# Patient Record
Sex: MALE | NOT HISPANIC OR LATINO | Employment: FULL TIME | URBAN - METROPOLITAN AREA
[De-identification: names, ages, dates, MRNs, and addresses within clinical notes are randomized per-mention and may not be internally consistent; named-entity substitution may affect disease eponyms.]

---

## 2019-05-14 ENCOUNTER — HOSPITAL ENCOUNTER (OUTPATIENT)
Dept: OTHER | Facility: HOSPITAL | Age: 29
Discharge: HOME | End: 2019-05-14
Attending: NURSE PRACTITIONER

## 2019-05-14 ENCOUNTER — OFFICE VISIT CONVERTED (OUTPATIENT)
Dept: FAMILY MEDICINE CLINIC | Age: 29
End: 2019-05-14
Attending: NURSE PRACTITIONER

## 2019-05-17 ENCOUNTER — HOSPITAL ENCOUNTER (OUTPATIENT)
Dept: OTHER | Facility: HOSPITAL | Age: 29
Discharge: HOME | End: 2019-05-17
Attending: NURSE PRACTITIONER

## 2019-05-17 ENCOUNTER — OFFICE VISIT CONVERTED (OUTPATIENT)
Dept: FAMILY MEDICINE CLINIC | Age: 29
End: 2019-05-17
Attending: NURSE PRACTITIONER

## 2019-05-20 LAB
AMPICILLIN SUSC ISLT: <=0.25
BACTERIA SPEC AEROBE CULT: ABNORMAL
CEFOTAXIME SUSC ISLT: <=0.12
CEFTRIAXONE SUSC ISLT: <=0.12
CIPROFLOXACIN SUSC ISLT: <=0.5
CLINDAMYCIN SUSC ISLT: 0.25
CLINDAMYCIN SUSC ISLT: <=0.25
DAPTOMYCIN SUSC ISLT: 1
DOXYCYCLINE SUSC ISLT: <=0.5
ERYTHROMYCIN SUSC ISLT: >=8
ERYTHROMYCIN SUSC ISLT: >=8
GENTAMICIN SUSC ISLT: <=0.5
LEVOFLOXACIN SUSC ISLT: 0.25
LEVOFLOXACIN SUSC ISLT: 0.5
OXACILLIN SUSC ISLT: >=4
PENICILLIN G SUSC ISLT: <=0.06
RIFAMPIN SUSC ISLT: <=0.5
TETRACYCLINE SUSC ISLT: <=0.25
TETRACYCLINE SUSC ISLT: <=1
TMP SMX SUSC ISLT: <=10
VANCOMYCIN SUSC ISLT: 1
VANCOMYCIN SUSC ISLT: <=0.12

## 2020-03-23 ENCOUNTER — OFFICE VISIT CONVERTED (OUTPATIENT)
Dept: FAMILY MEDICINE CLINIC | Age: 30
End: 2020-03-23
Attending: FAMILY MEDICINE

## 2021-02-10 ENCOUNTER — OFFICE VISIT CONVERTED (OUTPATIENT)
Dept: FAMILY MEDICINE CLINIC | Age: 31
End: 2021-02-10
Attending: NURSE PRACTITIONER

## 2021-02-12 ENCOUNTER — HOSPITAL ENCOUNTER (OUTPATIENT)
Dept: OTHER | Facility: HOSPITAL | Age: 31
Discharge: HOME | End: 2021-02-12
Attending: NURSE PRACTITIONER

## 2021-05-18 NOTE — PROGRESS NOTES
Usman Aguilar 1990     Office/Outpatient Visit    Visit Date: Tue, May 14, 2019 02:28 pm    Provider: Hanna Salgado N.P. (Assistant: Sarah Spurling, MA)    Location: [facility]        Electronically signed by Hanna Salgado N.P. on  05/15/2019 05:23:05 PM                             SUBJECTIVE:        CC:     Mr. Aguilar is a 28 year old male.  This is his first visit to the clinic.  Right toenail is infected.          HPI:         PHQ-9 Depression Screening: Completed form scanned and in chart; Total Score 0 Alcohol Consumption Screening: Completed form scanned and in chart; Total Score 5         Complaint of toe pain..  The symptom began 2 weeks ago.  The severity is of severe intensity.  He estimates that the frequency of this symptom is had to have a toenail cut out before - been some time ago.  Associated symptoms include he states taht he had a dog land on that toe a few days ago and caused it to split open as well  (last tetanus 2014).  Prior work-up has included has had toenails cut out in the past.  right great toe started as an ingrown toenail - tried to cut it out and now star     ROS:     CONSTITUTIONAL:  Negative for chills, fatigue and fever.      CARDIOVASCULAR:  Negative for chest pain and pedal edema.      RESPIRATORY:  Negative for recent cough and dyspnea.      INTEGUMENTARY:  Positive for right great toenail with laceration to the side of the toe - as well as ingrown toenail.   Negative for pruritis or rash.      NEUROLOGICAL:  Negative for paresthesias.          PMH/FMH/SH:     Past Medical History:             PAST MEDICAL HISTORY     UNREMARKABLE         Surgical History:     NONE         Family History:         Positive for Myocardial Infarction ( father -- MI (survived) 60 YOA ).      Positive for Breast Cancer ( mat. aunt ).      Positive for Type 2 Diabetes ( mat. GM; mat. uncle ).          Social History:     Occupation: [employer]     Marital Status:       Children: 2 children         Tobacco/Alcohol/Supplements:     Last Reviewed on 5/14/2019 02:35 PM by Spurling, Sarah C    Tobacco: He currently uses smokeless tobacco, 1 can a day cans/pouches daily..          Alcohol: Frequency:    Every now and then.;     Caffeine:  He admits to consuming caffeine via coffee, tea, and soda.          Substance Abuse History:     None         Mental Health History:     NEGATIVE         Communicable Diseases (eg STDs):     Reportable health conditions; NEGATIVE             Current Problems:     Toe pain     Seasonal allergies     Screening for depression         Immunizations:     None        Allergies:     Last Reviewed on 5/14/2019 02:35 PM by Spurling, Sarah C      No Known Drug Allergies.         Current Medications:     Last Reviewed on 5/14/2019 02:36 PM by Spurling, Sarah C    Zyrtec 10mg Capsules Take 1 capsule(s) by mouth daily         OBJECTIVE:        Vitals:         Current: 5/14/2019 2:37:14 PM    Ht:  5 ft, 10 in;  Wt: 161.6 lbs;  BMI: 23.2    T: 98.6 F;  BP: 140/71 mm Hg (right arm, sitting);  P: 87 bpm (right arm (BP Cuff), sitting)        Exams:     PHYSICAL EXAM:     GENERAL: Vitals recorded well developed, well nourished;  no apparent distress;     RESPIRATORY: normal appearance and symmetric expansion of chest wall; normal respiratory rate and pattern with no distress; normal breath sounds with no rales, rhonchi, wheezes or rubs;     CARDIOVASCULAR: normal rate; rhythm is regular;  no edema;     SKIN: laceration to right great toe - no acvit bleeding  - some discoloration of the lateral edge of the nail bed - evidence of purulent drainage, but after cleaning , unable to express any purulent drainage;     MUSCULOSKELETAL: normal gait; normal range of motion of all major muscle groups; no limb or joint pain with range of motion;     NEUROLOGIC: mental status: alert and oriented x 3;     PSYCHIATRIC: appropriate affect and demeanor; normal speech pattern; normal  thought and perception;         ASSESSMENT           V79.0   Z13.89  Screening for depression              DDx:     729.5   M79.674  Toe pain              DDx:         ORDERS:         Meds Prescribed:       Doxycycline Monohydrate 100mg Capsules Take 1 capsule(s) by mouth bid x10 days  #20 (Twenty) capsule(s) Refills: 0         Procedures Ordered:       REFER  Referral to Specialist or Other Facility  (Send-Out)           Other Orders:         Depression screen negative  (In-House)           Negative EtOH screen  (In-House)                   PLAN:          Toe pain         REFERRALS:  Referral initiated to a podiatrist ( for evaluation of ingrown toenail  - right great ).            Prescriptions:       Doxycycline Monohydrate 100mg Capsules Take 1 capsule(s) by mouth bid x10 days  #20 (Twenty) capsule(s) Refills: 0           Orders:       REFER  Referral to Specialist or Other Facility  (Send-Out)               Other Orders:         Depression screen negative  (In-House)           Negative EtOH screen  (In-House)           CHARGE CAPTURE           **Please note: ICD descriptions below are intended for billing purposes only and may not represent clinical diagnoses**        Primary Diagnosis:         V79.0 Screening for depression            Z13.89    Encounter for screening for other disorder              Orders:          54023   Office visit - new pt, level 1  (In-House)           729.5 Toe pain            M79.674    Pain in right toe(s)        Other Orders:              Depression screen negative  (In-House)                Negative EtOH screen  (In-House)

## 2021-05-18 NOTE — PROGRESS NOTES
Usman Aguilar  1990     Office/Outpatient Visit    Visit Date: Wed, Feb 10, 2021 11:19 am    Provider: Mimi Sweeney N.P. (Assistant: Blanca Edgar MA)    Location: [facility]        Electronically signed by Mimi Sweeney N.P. on  02/10/2021 06:35:58 PM                             Subjective:        CC: Gianfranco is a 30 year old White male.  right knee pain         HPI:           intermittent right knee pain since age 16-  when he was playing basketball and another person fell onto his right knee.  wore a brace and eventually had xray right knee a few months later.  recalls he had had some injury to knee cap.  he wears knee sleeve or brace almost daily.  does stand for 12 hr days 7 days per week currently.  works night shift at [employer].  takes ibuprofen with minimal relief.  no further injury since age 16.      ROS:     CONSTITUTIONAL:  Negative for chills, fatigue, fever, and weight change.      CARDIOVASCULAR:  Negative for chest pain, palpitations, tachycardia, orthopnea, and edema.      RESPIRATORY:  Negative for cough, dyspnea, and hemoptysis.      MUSCULOSKELETAL:  Positive for arthralgias (right knee pain).      INTEGUMENTARY:  Negative for rash.      NEUROLOGICAL:  Negative for dizziness, headaches, paresthesias, and weakness.      PSYCHIATRIC:  Negative for anxiety, depression, and sleep disturbances.          Past Medical History / Family History / Social History:         Last Reviewed on 2/10/2021 11:31 AM by Mimi Sweeney    Past Medical History:             PAST MEDICAL HISTORY     UNREMARKABLE         Surgical History:     NONE         Family History:         Positive for Myocardial Infarction ( father -- MI (survived) 60 YOA ).      Positive for Breast Cancer ( mat. aunt ).      Positive for Type 2 Diabetes ( mat. GM; mat. uncle ).          Social History:     Occupation: [employer]     Marital Status:      Children: 3 children         Tobacco/Alcohol/Supplements:      Last Reviewed on 2/10/2021 11:23 AM by Blanca Edgar    Tobacco: He currently uses smokeless tobacco, 1 can a day cans/pouches daily..          Alcohol: Frequency:    Every now and then.;     Caffeine:  He admits to consuming caffeine via coffee, tea, and soda.          Substance Abuse History:     Last Reviewed on 3/23/2020 09:43 AM by Reji Coronel    None         Mental Health History:     Last Reviewed on 3/23/2020 09:43 AM by Reji Coronel    NEGATIVE         Communicable Diseases (eg STDs):     Last Reviewed on 3/23/2020 09:43 AM by Reji Coronel    Reportable health conditions; NEGATIVE         Current Problems:     Last Reviewed on 2/10/2021 11:31 AM by Mimi Sweeney    Encounter for screening for other disorder        Immunizations:     influenza, injectable, quadrivalent, preservative free 10/1/2020        Allergies:     Last Reviewed on 2/10/2021 11:23 AM by Blanca Edgar    No Known Allergies.        Current Medications:     Last Reviewed on 2/10/2021 11:23 AM by Blanca Edgar    Zyrtec 10mg Capsules [Take 1 capsule(s) by mouth daily]        Objective:        Vitals:         Current: 2/10/2021 11:25:16 AM    Ht:  5 ft, 10 in;  Wt: 170.2 lbs;  BMI: 24.4T: 98.7 F (temporal);  BP: 131/77 mm Hg (left arm, sitting);  P: 98 bpm (left arm (BP Cuff), sitting)        Exams:     PHYSICAL EXAM:     GENERAL:  well developed and nourished; appropriately groomed; in no apparent distress;     RESPIRATORY: normal respiratory rate and pattern with no distress; normal breath sounds with no rales, rhonchi, wheezes or rubs;     CARDIOVASCULAR: normal rate; rhythm is regular;     Peripheral Pulses: popliteal: 2+ R;  no edema;     MUSCULOSKELETAL: pain with range of motion in: right knee flexion and extension;     NEUROLOGIC: mental status: alert and oriented x 3; GROSSLY INTACT     PSYCHIATRIC:  appropriate affect and demeanor; normal speech pattern; grossly normal memory;          Assessment:         M25.561   Pain in right knee           ORDERS:         Meds Prescribed:       [New Rx] Medrol (Marcos) 4 mg oral Tablet, Dose Pack [take by oral route as directed per package instructions], #21 (twenty one) tablets, Refills: 0 (zero)         Radiology/Test Orders:       [id number]  Right radiologic examination, knee; three views  (Send-Out)                      Plan:         Pain in right knee        RADIOLOGY:  I have ordered a right knee x-ray to be done today.      RECOMMENDATIONS given include: rest, ice, compression, elevation.  declines work note.  xray pending. consider PT..            Prescriptions:       [New Rx] Medrol (Marcos) 4 mg oral Tablet, Dose Pack [take by oral route as directed per package instructions], #21 (twenty one) tablets, Refills: 0 (zero)           Orders:       [id number]  Right radiologic examination, knee; three views  (Send-Out)                  Charge Capture:         Primary Diagnosis:     M25.561  Pain in right knee           Orders:      [id number]  Office/outpatient visit; established patient, level 3  (In-House)                  ADDENDUMS:      ____________________________________    Addendum: 02/15/2021 08:56 PM - Mimi Sweeney        add [id number]    remove [id number]. adrienne

## 2021-05-18 NOTE — PROGRESS NOTES
Usman Aguilar  1990     Office/Outpatient Visit    Visit Date: Mon, Mar 23, 2020 09:40 am    Provider: Reji Coronel MD     Location: [facility]        Electronically signed by Reji Coronel MD on  03/23/2020 04:47:06 PM                             Subjective:        CC: 'I was feeling a little hot'        TELEMEDICINE VISIT:    - Gianfranco consented to this telemedicine visit.    - Persons present during the telemedicine consultation include:  Gianfranco - patient, Dr. Coronel    HPI:       Gianfranco is concerned about fever.  He had a fever with temporal thermometer of 104 degrees.  He noted this a couple of days ago.  He also has had some pressure in the ears.  He noted some popping of the ears.  He did not feel bad really.  He does have some congestion in the chest for the first bit when he gets up in the mornings.  He has not checked the temperature today yet, but again he feels fairly well.  He does have some cough as part of this.  He does not feel short of breath and denies other acute issue today.  He is not sure how to move forward.  He does have allergies as noted and previously has been taking Zyrtec.    ROS:     CONSTITUTIONAL:  Positive for fever.   Negative for chills.      E/N/T:  Positive for nasal congestion.   Negative for sore throat.      CARDIOVASCULAR:  Negative for chest pain and palpitations.      RESPIRATORY:  Negative for dyspnea.      GASTROINTESTINAL:  Negative for abdominal pain, nausea and vomiting.      INTEGUMENTARY:  Negative for atypical mole(s) and rash.          Past Medical History / Family History / Social History:         Last Reviewed on 3/23/2020 09:43 AM by Reji Coronel    Past Medical History:             PAST MEDICAL HISTORY     UNREMARKABLE         Surgical History:     NONE         Family History:         Positive for Myocardial Infarction ( father -- MI (survived) 60 YOA ).      Positive for Breast Cancer ( mat. aunt ).      Positive for  Type 2 Diabetes ( mat. GM; mat. uncle ).          Social History:     Occupation: [employer]     Marital Status:      Children: 2 children         Tobacco/Alcohol/Supplements:     Last Reviewed on 3/23/2020 09:43 AM by Reji Coronel    Tobacco: He currently uses smokeless tobacco, 1 can a day cans/pouches daily..          Alcohol: Frequency:    Every now and then.;     Caffeine:  He admits to consuming caffeine via coffee, tea, and soda.          Substance Abuse History:     Last Reviewed on 3/23/2020 09:43 AM by Reji Coronel    None         Mental Health History:     Last Reviewed on 3/23/2020 09:43 AM by Reji Coronel    NEGATIVE         Communicable Diseases (eg STDs):     Last Reviewed on 3/23/2020 09:43 AM by Reji Coronel    Reportable health conditions; NEGATIVE         Current Problems:     Last Reviewed on 3/23/2020 09:43 AM by Reji Coronel    Screening for depression    Pain in right toe(s)    Encounter for screening for other disorder    Toe pain        Immunizations:     None        Allergies:     Last Reviewed on 3/23/2020 09:43 AM by Reji Coronel    No Known Allergies.        Current Medications:     Last Reviewed on 3/23/2020 09:43 AM by Reji Coronel    Zyrtec 10mg Capsules [Take 1 capsule(s) by mouth daily]        Assessment:         R50.9   Fever, unspecified           Plan:         Fever, unspecified        RECOMMENDATIONS given include: We have reviewed his symptoms this morning.  He is more than likely having some allergy symptoms.  The thermometer that he used over the weekend is questionable.  If he had a fever of 104 degrees, he would not have felt fine in my opinion.  He has not had a fever this morning and he did check it while on the phone with me.  I think it is very unlikely that he has COVID-19.  I did recommend though that he limit his exposure to people until he has been without symptoms for 72 hours.  Additionally, I  asked him to call back later in the week if needed.  If he does not feel better, we could see him again on the phone or possibly face to face.  He expressed understanding.  I advised Zyrtec or Zyrtect D and/or Tylenol as needed..  Telehealth: Verbal consent obtained for visit to occur via phone call; Total time spent was 12 minutes; [id number]--Wzktdkhry E/M 11-20 minutes             Charge Capture:         Primary Diagnosis:     R50.9  Fever, unspecified           Orders:      [id number]  Phys/QHP telephone evaluation 11-20 minutes  (In-House)

## 2021-05-18 NOTE — PROGRESS NOTES
Usman Aguilar 1990     Office/Outpatient Visit    Visit Date: Fri, May 17, 2019 01:51 pm    Provider: Hanna Salgdao N.P. (Assistant: Sarah Spurling, MA)    Location: [facility]        Electronically signed by Hanna Salgado N.P. on  05/20/2019 08:35:06 AM                             SUBJECTIVE:        CC:     Gianfranco is a 28 year old White male.  This is a follow-up visit.          HPI:         Patient to be evaluated for toe pain.  Today's visit is for evaluation of the right foot.  The location of the discomfort is primarily the great toe.  he is here today for follow up .  He is scheduled with Podiatry on Monday.  His toe redness seems to be worse, but the pain is improved.  The area that was related to the trauma is much improved     ROS:     CONSTITUTIONAL:  Negative for chills, fatigue and fever.      CARDIOVASCULAR:  Negative for chest pain, orthopnea, paroxysmal nocturnal dyspnea and pedal edema.      RESPIRATORY:  Negative for dyspnea and cough.      GASTROINTESTINAL:  Negative for abdominal pain, heartburn, constipation, diarrhea, and stool changes.      INTEGUMENTARY:  Positive for toe - redness and sore (but improved).      PSYCHIATRIC:  Negative for anxiety and depression.          PMH/FMH/SH:     Past Medical History:             PAST MEDICAL HISTORY     UNREMARKABLE         Surgical History:     NONE         Family History:         Positive for Myocardial Infarction ( father -- MI (survived) 60 YOA ).      Positive for Breast Cancer ( mat. aunt ).      Positive for Type 2 Diabetes ( mat. GM; mat. uncle ).          Social History:     Occupation: [employer]     Marital Status:      Children: 2 children         Tobacco/Alcohol/Supplements:     Last Reviewed on 5/17/2019 01:52 PM by Spurling, Sarah C    Tobacco: He currently uses smokeless tobacco, 1 can a day cans/pouches daily..          Alcohol: Frequency:    Every now and then.;     Caffeine:  He admits to consuming  caffeine via coffee, tea, and soda.          Substance Abuse History:     None         Mental Health History:     NEGATIVE         Communicable Diseases (eg STDs):     Reportable health conditions; NEGATIVE             Current Problems:     Toe pain     Seasonal allergies     Screening for depression         Immunizations:     None        Allergies:     Last Reviewed on 5/17/2019 01:52 PM by Spurling, Sarah C      No Known Drug Allergies.         Current Medications:     Last Reviewed on 5/17/2019 01:54 PM by Spurling, Sarah C    Doxycycline Monohydrate 100mg Capsules Take 1 capsule(s) by mouth bid x10 days     Zyrtec 10mg Capsules Take 1 capsule(s) by mouth daily         OBJECTIVE:        Vitals:         Current: 5/17/2019 1:55:51 PM    Ht:  5 ft, 10 in;  Wt: 158.2 lbs;  BMI: 22.7    T: 98.4 F;  BP: 140/64 mm Hg (right arm, sitting);  P: 79 bpm (right arm (BP Cuff), sitting)        Exams:     PHYSICAL EXAM:     GENERAL: Vitals recorded well developed, well nourished;  well groomed;  no apparent distress;     NECK:  supple, full ROM; no thyromegaly; no carotid bruits;     RESPIRATORY: normal respiratory rate and pattern with no distress; normal breath sounds with no rales, rhonchi, wheezes or rubs;     CARDIOVASCULAR: normal rate; rhythm is regular;  normal S1; normal S2; no systolic murmur; no cyanosis; no edema;     SKIN: great toe with erythema at the nail base, some scant drainage noted;     MUSCULOSKELETAL:  Normal range of motion, strength and tone;     NEUROLOGICAL:  cranial nerves, motor and sensory function, reflexes, gait and coordination are all intact;     PSYCHIATRIC:  appropriate affect and demeanor; normal speech pattern; grossly normal memory;         ASSESSMENT:           729.5   M79.674  Toe pain              DDx:         ORDERS:         Meds Prescribed:       Bactrim DS (Trimethoprim/Sulfamethoxazole ) Tablet Take 1 tablet(s) by mouth q12h for 10 days  #20 (Twenty) tablet(s) Refills: 0         Lab  Orders:       [id number]  [facility] Wound Culture  (Send-Out)                   PLAN:          Toe pain follow up with podiatry as scheduled     LABORATORY:  Labs ordered to be performed today include wound culture.            Prescriptions:       Bactrim DS (Trimethoprim/Sulfamethoxazole ) Tablet Take 1 tablet(s) by mouth q12h for 10 days  #20 (Twenty) tablet(s) Refills: 0           Orders:       [id number]  [facility] Wound Culture  (Send-Out)               CHARGE CAPTURE:           Primary Diagnosis:     729.5 Toe pain            M79.674    Pain in right toe(s)              Orders:          [id number]   Office/outpatient visit; established patient, level 2  (In-House)

## 2021-07-01 VITALS
BODY MASS INDEX: 22.65 KG/M2 | TEMPERATURE: 98.4 F | HEART RATE: 79 BPM | HEIGHT: 70 IN | SYSTOLIC BLOOD PRESSURE: 140 MMHG | DIASTOLIC BLOOD PRESSURE: 64 MMHG | WEIGHT: 158.2 LBS

## 2021-07-01 VITALS
DIASTOLIC BLOOD PRESSURE: 71 MMHG | TEMPERATURE: 98.6 F | BODY MASS INDEX: 23.13 KG/M2 | HEIGHT: 70 IN | SYSTOLIC BLOOD PRESSURE: 140 MMHG | HEART RATE: 87 BPM | WEIGHT: 161.6 LBS

## 2021-07-02 ENCOUNTER — OFFICE VISIT (OUTPATIENT)
Dept: FAMILY MEDICINE CLINIC | Age: 31
End: 2021-07-02

## 2021-07-02 VITALS
SYSTOLIC BLOOD PRESSURE: 131 MMHG | HEIGHT: 70 IN | BODY MASS INDEX: 24.37 KG/M2 | WEIGHT: 170.2 LBS | HEART RATE: 98 BPM | TEMPERATURE: 98.7 F | DIASTOLIC BLOOD PRESSURE: 77 MMHG

## 2021-07-02 VITALS
BODY MASS INDEX: 23.31 KG/M2 | DIASTOLIC BLOOD PRESSURE: 61 MMHG | SYSTOLIC BLOOD PRESSURE: 135 MMHG | HEART RATE: 78 BPM | HEIGHT: 70 IN | TEMPERATURE: 97.8 F | WEIGHT: 162.8 LBS

## 2021-07-02 DIAGNOSIS — R41.840 ATTENTION AND CONCENTRATION DEFICIT: Primary | ICD-10-CM

## 2021-07-02 PROCEDURE — 99213 OFFICE O/P EST LOW 20 MIN: CPT | Performed by: NURSE PRACTITIONER

## 2021-07-02 RX ORDER — CETIRIZINE HYDROCHLORIDE 10 MG/1
10 TABLET ORAL AS NEEDED
COMMUNITY

## 2021-07-02 NOTE — PROGRESS NOTES
"Chief Complaint  ADD (wanting back on medicine)    Subjective          Usman Aguilar presents to [facility]   Asher is here for follow up on his ADD.  He reports that he has previously been on multiple medications all through school.   Currently feeling like he may need to get back on something.  He is working at a job that requires him to stay focus.  He works for [employer] .  He feels that he has been having 'brain pauses' and is working independently - which he feels makes this more difficult.  He is at risk of losing his job.  He does report that he has lost jobs due to his attentive.    Review of Systems   Constitutional: Negative for fatigue and fever.   Respiratory: Negative for shortness of breath.    Cardiovascular: Negative for chest pain.   Psychiatric/Behavioral: Positive for decreased concentration and stress. Negative for behavioral problems, dysphoric mood, sleep disturbance and negative for hyperactivity. The patient is not nervous/anxious.          Health Maintenance Due   Topic Date Due   • ANNUAL PHYSICAL  Never done   • TDAP/TD VACCINES (2 - Tdap) 08/13/2014   • HEPATITIS C SCREENING  Never done        Objective     Vital Signs:   /61 (BP Location: Right arm, Patient Position: Sitting)   Pulse 78   Temp 97.8 °F (36.6 °C) (Oral)   Ht 177.8 cm (70\")   Wt 73.8 kg (162 lb 12.8 oz)   BMI 23.36 kg/m²       Physical Exam  Vitals reviewed.   Constitutional:       Appearance: Normal appearance.   HENT:      Head: Normocephalic.   Eyes:      Pupils: Pupils are equal, round, and reactive to light.   Cardiovascular:      Rate and Rhythm: Normal rate and regular rhythm.      Heart sounds: No murmur heard.     Pulmonary:      Effort: Pulmonary effort is normal.      Breath sounds: Normal breath sounds.   Musculoskeletal:         General: Normal range of motion.   Neurological:      Mental Status: He is alert.   Psychiatric:         Mood and Affect: Mood " normal. Affect is flat.         Behavior: Behavior normal.          Result Review :                      Assessment and Plan    Diagnoses and all orders for this visit:    1. Attention and concentration deficit (Primary)  Comments:  Will refer to mental health provider for further reommendations and management             Follow Up    Return if symptoms worsen or fail to improve, for Annual physical.      Patient was given instructions and counseling regarding his condition or for health maintenance advice. Please see specific information pulled into the AVS if appropriate.

## 2022-02-18 ENCOUNTER — OFFICE VISIT (OUTPATIENT)
Dept: FAMILY MEDICINE CLINIC | Age: 32
End: 2022-02-18

## 2022-02-18 VITALS
DIASTOLIC BLOOD PRESSURE: 66 MMHG | BODY MASS INDEX: 22.36 KG/M2 | HEIGHT: 70 IN | SYSTOLIC BLOOD PRESSURE: 120 MMHG | HEART RATE: 86 BPM | WEIGHT: 156.2 LBS | TEMPERATURE: 98.5 F

## 2022-02-18 DIAGNOSIS — B02.8 HERPES ZOSTER WITH OTHER COMPLICATION: Primary | ICD-10-CM

## 2022-02-18 DIAGNOSIS — B00.53 HERPESVIRAL CONJUNCTIVITIS: ICD-10-CM

## 2022-02-18 PROBLEM — R16.1 SPLENOMEGALY: Status: ACTIVE | Noted: 2022-02-18

## 2022-02-18 PROCEDURE — 99213 OFFICE O/P EST LOW 20 MIN: CPT | Performed by: NURSE PRACTITIONER

## 2022-02-18 RX ORDER — VALACYCLOVIR HYDROCHLORIDE 1 G/1
1000 TABLET, FILM COATED ORAL 3 TIMES DAILY
Qty: 21 TABLET | Refills: 0 | Status: SHIPPED | OUTPATIENT
Start: 2022-02-18 | End: 2022-02-25

## 2022-02-18 RX ORDER — PREDNISONE 20 MG/1
TABLET ORAL
Qty: 8 TABLET | Refills: 0 | Status: SHIPPED | OUTPATIENT
Start: 2022-02-18 | End: 2022-02-28

## 2022-02-18 NOTE — PROGRESS NOTES
"Chief Complaint  Usman Aguilar presents to [facility] for Hospital Follow Up Visit ([facility] ER 2/6/-2/7 cluster headaches 2/10-2/11 reaction to medicine for cluster headaches)    Subjective          Usman was seen in the ER at  [facility] on 2/10/2022 and returned again on 2/11/2022 was diagnosed with  Cluster HA. /  Allergic reaction  Abnormal findings  included CT head with chronic sinusitis  Otherwise normal.   His treatment included \"some medication for HA'(ER visit note unavailable at the time of this visit- requested) , steroids when returning for follow up reaction   Additional recommendations include:The following day, developed a rash on his face - right side / head and into eye and was told allergic reaction to medication - was given steroids and told follow up here.    Usman reports that his condition has changed / progressed  is since discharge.  He reports that the rash has worsened, itching now and from scalp and down onto the right side of his face.  His right eye is red/ draining  - denies vision disturbance            No Known Allergies   Past Medical History:   Diagnosis Date   • Pain in right knee      Current Outpatient Medications   Medication Sig Dispense Refill   • cetirizine (zyrTEC) 10 MG tablet Take 10 mg by mouth As Needed for Allergies.     • predniSONE (DELTASONE) 20 MG tablet Take 1 tablet by mouth Daily for 5 days, THEN 0.5 tablets Daily for 5 days. 8 tablet 0   • valACYclovir (Valtrex) 1000 MG tablet Take 1 tablet by mouth 3 (Three) Times a Day for 7 days. 21 tablet 0     No current facility-administered medications for this visit.     No past surgical history on file.   Social History     Tobacco Use   • Smoking status: Never Smoker   • Smokeless tobacco: Current User     Types: Chew   Substance Use Topics   • Alcohol use: Yes     Comment: EVERY NOW AND THEN    • Drug use: Not on file     Comment: NONE     Family History   Problem " "Relation Age of Onset   • Heart attack Father 60   • Diabetes type II Maternal Grandmother    • Breast cancer Maternal Aunt    • Diabetes type II Maternal Uncle      Health Maintenance Due   Topic Date Due   • ANNUAL PHYSICAL  Never done   • TDAP/TD VACCINES (2 - Tdap) 08/13/2014   • HEPATITIS C SCREENING  Never done   • COVID-19 Vaccine (3 - Booster for Moderna series) 10/11/2021      Immunization History   Administered Date(s) Administered   • COVID-19 (MODERNA) 1st, 2nd, 3rd Dose Only 04/13/2021, 05/11/2021   • DTaP, Unspecified 11/13/1996   • Flu Vaccine Quad PF >36MO 09/29/2020   • Influenza TIV (IM) 09/15/2021   • Influenza, Unspecified 10/01/2020   • MMR 11/13/1996   • OPV 11/13/1996   • Td 08/13/2004        Objective     Vitals:    02/18/22 1123   BP: 120/66   BP Location: Left arm   Patient Position: Sitting   Pulse: 86   Temp: 98.5 °F (36.9 °C)   TempSrc: Oral   Weight: 70.9 kg (156 lb 3.2 oz)   Height: 177.8 cm (70\")     Body mass index is 22.41 kg/m².     Physical Exam  Vitals reviewed.   Constitutional:       Appearance: Normal appearance.   HENT:      Head: Normocephalic.   Eyes:      Pupils: Pupils are equal, round, and reactive to light.   Cardiovascular:      Rate and Rhythm: Normal rate and regular rhythm.      Heart sounds: No murmur heard.      Pulmonary:      Effort: Pulmonary effort is normal.      Breath sounds: Normal breath sounds.   Musculoskeletal:         General: Normal range of motion.   Skin:     Findings: Rash present. Rash is crusting and vesicular.   Neurological:      Mental Status: He is alert.   Psychiatric:         Mood and Affect: Mood normal.         Behavior: Behavior normal.           Result Review :                               Assessment and Plan      Diagnoses and all orders for this visit:    1. Herpes zoster with other complication (Primary)  Comments:  although time frame extended , will start on antiviral , extend steroid treatment and follow up in 1 week  Orders:  - "     Ambulatory Referral to Ophthalmology  -     valACYclovir (Valtrex) 1000 MG tablet; Take 1 tablet by mouth 3 (Three) Times a Day for 7 days.  Dispense: 21 tablet; Refill: 0  -     predniSONE (DELTASONE) 20 MG tablet; Take 1 tablet by mouth Daily for 5 days, THEN 0.5 tablets Daily for 5 days.  Dispense: 8 tablet; Refill: 0    2. Herpesviral conjunctivitis  Comments:  urgent referral to opth for conjunctivitis  - no vision changes at this point, but want evaluation  Orders:  -     Ambulatory Referral to Ophthalmology  -     valACYclovir (Valtrex) 1000 MG tablet; Take 1 tablet by mouth 3 (Three) Times a Day for 7 days.  Dispense: 21 tablet; Refill: 0  -     predniSONE (DELTASONE) 20 MG tablet; Take 1 tablet by mouth Daily for 5 days, THEN 0.5 tablets Daily for 5 days.  Dispense: 8 tablet; Refill: 0              Follow Up     Return in about 1 week (around 2/25/2022).